# Patient Record
Sex: FEMALE | Race: WHITE | URBAN - METROPOLITAN AREA
[De-identification: names, ages, dates, MRNs, and addresses within clinical notes are randomized per-mention and may not be internally consistent; named-entity substitution may affect disease eponyms.]

---

## 2021-05-26 ENCOUNTER — OFFICE VISIT (OUTPATIENT)
Dept: FAMILY MEDICINE CLINIC | Age: 21
End: 2021-05-26
Payer: COMMERCIAL

## 2021-05-26 VITALS
WEIGHT: 132.2 LBS | DIASTOLIC BLOOD PRESSURE: 60 MMHG | HEART RATE: 80 BPM | OXYGEN SATURATION: 98 % | SYSTOLIC BLOOD PRESSURE: 110 MMHG

## 2021-05-26 DIAGNOSIS — L98.9 SKIN LESION: ICD-10-CM

## 2021-05-26 DIAGNOSIS — Z00.00 HEALTHCARE MAINTENANCE: ICD-10-CM

## 2021-05-26 DIAGNOSIS — Z76.89 ENCOUNTER TO ESTABLISH CARE: Primary | ICD-10-CM

## 2021-05-26 DIAGNOSIS — J30.2 SEASONAL ALLERGIES: ICD-10-CM

## 2021-05-26 PROCEDURE — 99203 OFFICE O/P NEW LOW 30 MIN: CPT | Performed by: FAMILY MEDICINE

## 2021-05-26 RX ORDER — CETIRIZINE HYDROCHLORIDE 10 MG/1
10 TABLET ORAL DAILY
Qty: 90 TABLET | Refills: 1 | Status: SHIPPED | OUTPATIENT
Start: 2021-05-26 | End: 2021-11-22

## 2021-05-26 ASSESSMENT — PATIENT HEALTH QUESTIONNAIRE - PHQ9
SUM OF ALL RESPONSES TO PHQ QUESTIONS 1-9: 0
1. LITTLE INTEREST OR PLEASURE IN DOING THINGS: 0
SUM OF ALL RESPONSES TO PHQ QUESTIONS 1-9: 0
SUM OF ALL RESPONSES TO PHQ9 QUESTIONS 1 & 2: 0
SUM OF ALL RESPONSES TO PHQ QUESTIONS 1-9: 0
2. FEELING DOWN, DEPRESSED OR HOPELESS: 0

## 2021-05-26 NOTE — PATIENT INSTRUCTIONS
Patient Education        Learning About Birth Control: Combination Pills  What are combination pills? Combination pills are used to prevent pregnancy. Most people call them \"the pill. \"  Combination pills release a regular dose of two hormones, estrogen and progestin. They prevent pregnancy in a few ways. They thicken the mucus in the cervix. This makes it hard for sperm to travel into the uterus. And they thin the lining of the uterus. This makes it harder for a fertilized egg to attach to the uterus. The hormones also can stop the ovaries from releasing an egg each month (ovulation). You have to take a pill every day to prevent pregnancy. The packages for these pills are different. The most common one has 3 weeks of hormone pills and 1 week of sugar pills. The sugar pills don't contain any hormones. You have your period on that week. But other packs have no sugar pills. If you take hormone pills for the whole month, you will not get your period as often. Or you may not get it at all. How well do they work? In the first year of use:  · When combination pills are taken exactly as directed, fewer than 1 woman out of 100 has an unplanned pregnancy. · When pills are not taken exactly as directed, such as forgetting to take them sometimes, 9 women out of 100 have an unplanned pregnancy. Be sure to tell your doctor about any health problems you have or medicines you take. He or she can help you choose the birth control method that is right for you. What are the advantages of combination pills? · These pills work better than barrier methods. Barrier methods include condoms and diaphragms. · They may reduce acne and heavy bleeding. They may also reduce cramping and other symptoms of PMS (premenstrual syndrome). · The pills let you control your periods. You can have periods every month or every few months. Or you can choose not to have them at all. · You don't have to interrupt sex to use the pills.   What are the disadvantages of combination pills? · You have to take a pill at the same time every day to prevent pregnancy. · Combination pills don't protect against sexually transmitted infections (STIs), such as herpes or HIV/AIDS. If you aren't sure if your sex partner might have an STI, use a condom to protect against disease. · They may cause changes in your period. You may have little bleeding, skipped periods, or spotting. · They may cause mood changes, less interest in sex, or weight gain. · Combination pills contain estrogen. They may not be right for you if you have certain health problems. Where can you learn more? Go to https://[email or web address]. org and sign in to your [employer] account. Enter Q231 in the [employer] box to learn more about \"Learning About Birth Control: Combination Pills. \"     If you do not have an account, please click on the \"Sign Up Now\" link. Current as of: October 8, 2020               Content Version: 12.8  © 2006-2021 Healthwise[employer] Incorporated. Care instructions adapted under license by [facility] ([facility]). If you have questions about a medical condition or this instruction, always ask your healthcare professional. [place] [address] any warranty or liability for your use of this information.

## 2021-05-26 NOTE — PROGRESS NOTES
5/26/2021    This is a 22 y.o. female who presents for  Chief Complaint   Patient presents with    New Patient       HPI:     No PMHx  Requests a referral to dermatology and gynecology   Seasonal allergies: takes zyrtec PRN  UTD with childhood vaccines     No acute concerning Sxs: No CP, SOB, palpitations, dizziness, HA, etc.      Past Medical History:   Diagnosis Date    Seasonal allergies        History reviewed. No pertinent surgical history. Social History     Socioeconomic History    Marital status: Single     Spouse name: Not on file    Number of children: Not on file    Years of education: Not on file    Highest education level: Not on file   Occupational History    Not on file   Tobacco Use    Smoking status: Never Smoker    Smokeless tobacco: Never Used   Vaping Use    Vaping Use: Never used   Substance and Sexual Activity    Alcohol use: Never    Drug use: Never    Sexual activity: Never   Other Topics Concern    Not on file   Social History Narrative    Not on file     Social Determinants of Health     Financial Resource Strain:     Difficulty of Paying Living Expenses:    Food Insecurity:     Worried About Running Out of Food in the Last Year:     [address] in the Last Year:    Transportation Needs:     Lack of Transportation (Medical):      Lack of Transportation (Non-Medical):    Physical Activity:     Days of Exercise per Week:     Minutes of Exercise per Session:    Stress:     Feeling of Stress :    Social Connections:     Frequency of Communication with Friends and Family:     Frequency of Social Gatherings with Friends and Family:     Attends Congregational Services:     Active Member of Clubs or Organizations:     Attends Club or Organization Meetings:     Marital Status:    Intimate Partner Violence:     Fear of Current or Ex-Partner:     Emotionally Abused:     Physically Abused:     Sexually Abused:        Family History   Problem Relation Age of Onset    Hypertension Father     Other Maternal Grandmother         Pulmonary HTN     Cancer Maternal Grandfather     Hypertension Paternal Grandmother     Diabetes Paternal Grandfather     Hypertension Paternal Grandfather     Uterine Cancer Maternal Great Grandmother     Breast Cancer Maternal Aunt        Current Outpatient Medications   Medication Sig Dispense Refill    cetirizine (ZYRTEC) 10 MG tablet Take 1 tablet by mouth daily 90 tablet 1    Cetirizine HCl (ZYRTEC ALLERGY PO) Take  by mouth. No current facility-administered medications for this visit. Immunization History   Administered Date(s) Administered    Influenza Virus Vaccine 10/27/2014       No Known Allergies    Review of Systems   Constitutional: Negative for activity change, appetite change, chills, diaphoresis, fatigue and fever. Eyes: Negative for visual disturbance. Respiratory: Negative for chest tightness and shortness of breath. Cardiovascular: Negative for chest pain, palpitations and leg swelling. Gastrointestinal: Negative for abdominal pain, nausea and vomiting. Genitourinary: Negative for decreased urine volume. Skin: Negative for color change. Neurological: Negative for dizziness, weakness, light-headedness, numbness and headaches. /60 (Site: Left Upper Arm, Position: Sitting, Cuff Size: Medium Adult)   Pulse 80   Wt 132 lb 3.2 oz (60 kg)   LMP 04/28/2021   SpO2 98%     Physical Exam  Vitals and nursing note reviewed. Constitutional:       General: She is not in acute distress. Appearance: She is well-developed. She is not diaphoretic. HENT:      Head: Normocephalic and atraumatic. Eyes:      Conjunctiva/sclera: Conjunctivae normal.      Pupils: Pupils are equal, round, and reactive to light. Neck:      Vascular: No JVD. Cardiovascular:      Rate and Rhythm: Normal rate and regular rhythm. Heart sounds: Normal heart sounds. No murmur heard. No friction rub. No gallop.

## 2021-06-18 PROBLEM — J30.2 SEASONAL ALLERGIES: Status: ACTIVE | Noted: 2021-06-18

## 2021-06-18 ASSESSMENT — ENCOUNTER SYMPTOMS
NAUSEA: 0
COLOR CHANGE: 0
ABDOMINAL PAIN: 0
SHORTNESS OF BREATH: 0
CHEST TIGHTNESS: 0
VOMITING: 0

## 2022-04-22 ENCOUNTER — HOSPITAL ENCOUNTER (OUTPATIENT)
Dept: ULTRASOUND IMAGING | Age: 22
Discharge: HOME | End: 2022-04-22
Payer: COMMERCIAL

## 2022-04-22 ENCOUNTER — OFFICE VISIT (OUTPATIENT)
Dept: FAMILY MEDICINE CLINIC | Age: 22
End: 2022-04-22
Payer: COMMERCIAL

## 2022-04-22 VITALS
DIASTOLIC BLOOD PRESSURE: 62 MMHG | TEMPERATURE: 98.2 F | SYSTOLIC BLOOD PRESSURE: 108 MMHG | BODY MASS INDEX: 22.5 KG/M2 | WEIGHT: 127 LBS | OXYGEN SATURATION: 98 % | HEART RATE: 72 BPM | HEIGHT: 63 IN

## 2022-04-22 DIAGNOSIS — E07.9 ASYMMETRICAL THYROID: ICD-10-CM

## 2022-04-22 DIAGNOSIS — Z00.00 ENCOUNTER FOR WELL ADULT EXAM WITHOUT ABNORMAL FINDINGS: Primary | ICD-10-CM

## 2022-04-22 PROCEDURE — 76536 US EXAM OF HEAD AND NECK: CPT

## 2022-04-22 PROCEDURE — 99395 PREV VISIT EST AGE 18-39: CPT | Performed by: FAMILY MEDICINE

## 2022-04-22 SDOH — ECONOMIC STABILITY: FOOD INSECURITY: WITHIN THE PAST 12 MONTHS, YOU WORRIED THAT YOUR FOOD WOULD RUN OUT BEFORE YOU GOT MONEY TO BUY MORE.: NEVER TRUE

## 2022-04-22 SDOH — ECONOMIC STABILITY: FOOD INSECURITY: WITHIN THE PAST 12 MONTHS, THE FOOD YOU BOUGHT JUST DIDN'T LAST AND YOU DIDN'T HAVE MONEY TO GET MORE.: NEVER TRUE

## 2022-04-22 ASSESSMENT — PATIENT HEALTH QUESTIONNAIRE - PHQ9
SUM OF ALL RESPONSES TO PHQ QUESTIONS 1-9: 0
2. FEELING DOWN, DEPRESSED OR HOPELESS: 0
SUM OF ALL RESPONSES TO PHQ9 QUESTIONS 1 & 2: 0
1. LITTLE INTEREST OR PLEASURE IN DOING THINGS: 0

## 2022-04-22 ASSESSMENT — ENCOUNTER SYMPTOMS
CONSTIPATION: 0
ABDOMINAL PAIN: 0
DIARRHEA: 0
VOMITING: 0
TROUBLE SWALLOWING: 0
SHORTNESS OF BREATH: 0
COLOR CHANGE: 0
BACK PAIN: 0
NAUSEA: 0
COUGH: 0
BLOOD IN STOOL: 0

## 2022-04-22 ASSESSMENT — SOCIAL DETERMINANTS OF HEALTH (SDOH): HOW HARD IS IT FOR YOU TO PAY FOR THE VERY BASICS LIKE FOOD, HOUSING, MEDICAL CARE, AND HEATING?: NOT HARD AT ALL

## 2022-04-22 NOTE — PROGRESS NOTES
Well Adult Note  Name: Tara Jimenez Date: 2022   MRN: [id number] Sex: Female   Age: 25 y.o. Ethnicity: Non- / Non    : 2000 Race: White (non-)      [place] Ty[place] is here for well adult exam.  History:    Exercise: no regular exercise  Diet: \"it's not bad, not picky\"    Patient's last menstrual period was 2022 (approximate). menstrual cycle: normal, monthly   Heavy for 2-3 days  Sexual activity: none   AbnormalSxs: no  Last PAP: n/a, discussed   Follows with gynecology? no    Last Mammogram: no  Family Hx of ovarian, breast, or uterine cancer: yes - updated  Self-breast exams: yes - intermittently     Previous DEXA scan: n/a    Previous Colonoscopy no  BRBR, unexplained WL, bloating, abd pain No  Family Hx of Colon Ca  no    Review of Systems   Constitutional: Negative for activity change, appetite change, chills, diaphoresis, fatigue, fever and unexpected weight change. HENT: Negative for ear pain, hearing loss and trouble swallowing. Eyes: Negative for visual disturbance. Respiratory: Negative for cough and shortness of breath. Cardiovascular: Negative for chest pain, palpitations and leg swelling. Gastrointestinal: Negative for abdominal pain, blood in stool, constipation, diarrhea, nausea and vomiting. Genitourinary: Negative for decreased urine volume, difficulty urinating, dysuria, flank pain, hematuria and urgency. Musculoskeletal: Negative for arthralgias and back pain. Skin: Negative for color change and rash. Neurological: Negative for dizziness, weakness, light-headedness, numbness and headaches. Psychiatric/Behavioral: Negative for dysphoric mood and sleep disturbance. The patient is not nervous/anxious. No Known Allergies      Prior to Visit Medications    Medication Sig Taking? Authorizing Provider   Cetirizine HCl (ZYRTEC ALLERGY PO) Take  by mouth.  Yes Historical Provider, MD         Past Medical History:   Diagnosis Date  Seasonal allergies        No past surgical history on file. Family History   Problem Relation Age of Onset    Hypertension Father     Other Maternal Grandmother         Pulmonary HTN     Cancer Maternal Grandfather     Hypertension Paternal Grandmother     Diabetes Paternal Grandfather     Hypertension Paternal Grandfather     Uterine Cancer Maternal Great Grandmother     Breast Cancer Maternal Aunt        Social History     Tobacco Use    Smoking status: Never Smoker    Smokeless tobacco: Never Used   Vaping Use    Vaping Use: Never used   Substance Use Topics    Alcohol use: Never    Drug use: Never       Objective   /62 (Site: Right Upper Arm, Position: Sitting, Cuff Size: Small Adult)   Pulse 72   Temp 98.2 °F (36.8 °C)   Ht 5' 3.39\" (1.61 m) Comment: without shoes  Wt 127 lb (57.6 kg) Comment: without shoe  LMP 03/30/2022 (Approximate)   SpO2 98%   BMI 22.22 kg/m²   Wt Readings from Last 3 Encounters:   04/22/22 127 lb (57.6 kg)   05/26/21 132 lb 3.2 oz (60 kg)   10/27/14 106 lb 9.6 oz (48.4 kg) (51 %, Z= 0.01)*     * Growth percentiles are based on CDC (Girls, 2-20 Years) data. There were no vitals filed for this visit. Physical Exam  Vitals and nursing note reviewed. Constitutional:       General: She is not in acute distress. Appearance: She is well-developed. She is not diaphoretic. HENT:      Head: Normocephalic and atraumatic. Right Ear: External ear normal.      Left Ear: External ear normal.      Mouth/Throat:      Pharynx: No oropharyngeal exudate. Eyes:      General:         Right eye: No discharge. Left eye: No discharge. Conjunctiva/sclera: Conjunctivae normal.      Pupils: Pupils are equal, round, and reactive to light. Neck:      Thyroid: No thyromegaly. Comments: R>L thyroid gland, mild  Cardiovascular:      Rate and Rhythm: Normal rate and regular rhythm. Heart sounds: Normal heart sounds. No murmur heard.   No friction rub. No gallop. Pulmonary:      Effort: Pulmonary effort is normal. No respiratory distress. Breath sounds: Normal breath sounds. No wheezing or rales. Abdominal:      General: Bowel sounds are normal. There is no distension. Palpations: Abdomen is soft. Tenderness: There is no abdominal tenderness. There is no guarding or rebound. Musculoskeletal:         General: Normal range of motion. Cervical back: Normal range of motion and neck supple. Lymphadenopathy:      Cervical: No cervical adenopathy. Skin:     General: Skin is warm and dry. Coloration: Skin is not pale. Findings: No erythema or rash. Neurological:      Mental Status: She is alert. Cranial Nerves: No cranial nerve deficit. Coordination: Coordination normal.   Psychiatric:         Behavior: Behavior normal.         Thought Content: Thought content normal.         Judgment: Judgment normal.           Assessment   Plan   1. Encounter for well adult exam without abnormal findings  2.  Asymmetrical thyroid  -     US THYROID; Future         Personalized Preventive Plan   Current Health Maintenance Status  Immunization History   Administered Date(s) Administered    DTP 2001, 2001, 2001, 09/20/2002, 08/06/2006    HPV 9-valent Mace ) 05/24/2018, 06/24/2018, 12/30/2018    Hepatitis B Ped/Adol (Engerix-B, Recombivax HB) 2001, 2001, 09/20/2002    Hib vaccine 2001, 2001, 2001    Influenza Virus Vaccine 10/27/2014    Influenza, Denice Leilani, IM, PF (6 mo and older Fluzone, Flulaval, Fluarix, and 3 yrs and older Afluria) 12/30/2018    MMR 06/17/2002, 03/08/2005    Meningococcal B, Recombinant Milon Roam) 05/24/2018, 06/24/2018    Meningococcal MCV4P (Menactra) 08/13/2018    Pneumococcal Conjugate 7-valent (Prevnar7) 03/10/2003    Polio IPV (IPOL) 08/23/2013    Polio Virus Vaccine 2001, 2001, 09/20/2002    Tdap (Boostrix, Adacel) 08/23/2013  Varicella (Varivax) 06/17/2002        Health Maintenance   Topic Date Due    Varicella vaccine (2 of 2 - 2-dose childhood series) 03/02/2005    HIV screen  Never done    Chlamydia screen  Never done    Hepatitis C screen  Never done    Pap smear  Never done    COVID-19 Vaccine (1) 04/22/2023 (Originally 3/2/2006)    Depression Screen  05/26/2022    Flu vaccine (Season Ended) 09/01/2022    DTaP/Tdap/Td vaccine (6 - Td or Tdap) 08/23/2023    Hepatitis B vaccine  Completed    HPV vaccine  Completed    Meningococcal (ACWY) vaccine  Completed    Hepatitis A vaccine  Aged Out    Hib vaccine  Aged Out    Pneumococcal 0-64 years Vaccine  Aged Out     Recommendations for Styky Due: see orders and patient instructions/AVS.    Return in about 1 year (around 4/22/2023) for annual physical.

## 2022-04-22 NOTE — PATIENT INSTRUCTIONS

## 2022-04-22 NOTE — Clinical Note
I forgot to give the paper for the thyroid US during her physical. Please call pt with instructions on how to schedule her thyroid ultrasound.  Thank you

## 2022-04-25 ENCOUNTER — TELEPHONE (OUTPATIENT)
Dept: FAMILY MEDICINE CLINIC | Age: 22
End: 2022-04-25

## 2022-04-25 NOTE — TELEPHONE ENCOUNTER
Left message for patient to call back. Please give her Central scheduling phone number [phone]. This is to schedule her Thyroid Ultrasound.

## 2022-04-25 NOTE — TELEPHONE ENCOUNTER
----- Message from Joni Huitron MD sent at 4/22/2022  9:32 AM EDT -----  I forgot to give the paper for the thyroid US during her physical. Please call pt with instructions on how to schedule her thyroid ultrasound.  Thank you

## 2022-06-01 DIAGNOSIS — E07.9 ASYMMETRICAL THYROID: Primary | ICD-10-CM

## 2022-06-02 DIAGNOSIS — E07.9 ASYMMETRICAL THYROID: ICD-10-CM

## 2022-06-02 LAB
T4 FREE: 1.3 NG/DL (ref 0.9–1.8)
TSH SERPL DL<=0.05 MIU/L-ACNC: 1.57 UIU/ML (ref 0.27–4.2)

## 2023-05-18 ENCOUNTER — PROCEDURE VISIT (OUTPATIENT)
Dept: FAMILY MEDICINE CLINIC | Age: 23
End: 2023-05-18

## 2023-05-18 VITALS
DIASTOLIC BLOOD PRESSURE: 64 MMHG | HEIGHT: 63 IN | BODY MASS INDEX: 22.47 KG/M2 | SYSTOLIC BLOOD PRESSURE: 110 MMHG | OXYGEN SATURATION: 98 % | TEMPERATURE: 98.3 F | WEIGHT: 126.8 LBS | HEART RATE: 72 BPM

## 2023-05-18 DIAGNOSIS — L98.9 SKIN LESION: Primary | ICD-10-CM

## 2023-05-18 DIAGNOSIS — D48.5 NEOPLASM OF UNCERTAIN BEHAVIOR OF SKIN: ICD-10-CM

## 2023-05-18 DIAGNOSIS — L81.9 HYPERPIGMENTED SKIN LESION: ICD-10-CM

## 2023-05-18 SDOH — ECONOMIC STABILITY: FOOD INSECURITY: WITHIN THE PAST 12 MONTHS, THE FOOD YOU BOUGHT JUST DIDN'T LAST AND YOU DIDN'T HAVE MONEY TO GET MORE.: NEVER TRUE

## 2023-05-18 SDOH — ECONOMIC STABILITY: INCOME INSECURITY: HOW HARD IS IT FOR YOU TO PAY FOR THE VERY BASICS LIKE FOOD, HOUSING, MEDICAL CARE, AND HEATING?: NOT HARD AT ALL

## 2023-05-18 SDOH — ECONOMIC STABILITY: HOUSING INSECURITY
IN THE LAST 12 MONTHS, WAS THERE A TIME WHEN YOU DID NOT HAVE A STEADY PLACE TO SLEEP OR SLEPT IN A SHELTER (INCLUDING NOW)?: NO

## 2023-05-18 SDOH — ECONOMIC STABILITY: FOOD INSECURITY: WITHIN THE PAST 12 MONTHS, YOU WORRIED THAT YOUR FOOD WOULD RUN OUT BEFORE YOU GOT MONEY TO BUY MORE.: NEVER TRUE

## 2023-05-18 ASSESSMENT — PATIENT HEALTH QUESTIONNAIRE - PHQ9
3. TROUBLE FALLING OR STAYING ASLEEP: 0
5. POOR APPETITE OR OVEREATING: 0
SUM OF ALL RESPONSES TO PHQ QUESTIONS 1-9: 0
4. FEELING TIRED OR HAVING LITTLE ENERGY: 0
10. IF YOU CHECKED OFF ANY PROBLEMS, HOW DIFFICULT HAVE THESE PROBLEMS MADE IT FOR YOU TO DO YOUR WORK, TAKE CARE OF THINGS AT HOME, OR GET ALONG WITH OTHER PEOPLE: 0
2. FEELING DOWN, DEPRESSED OR HOPELESS: 0
6. FEELING BAD ABOUT YOURSELF - OR THAT YOU ARE A FAILURE OR HAVE LET YOURSELF OR YOUR FAMILY DOWN: 0
SUM OF ALL RESPONSES TO PHQ9 QUESTIONS 1 & 2: 0
SUM OF ALL RESPONSES TO PHQ QUESTIONS 1-9: 0
8. MOVING OR SPEAKING SO SLOWLY THAT OTHER PEOPLE COULD HAVE NOTICED. OR THE OPPOSITE, BEING SO FIGETY OR RESTLESS THAT YOU HAVE BEEN MOVING AROUND A LOT MORE THAN USUAL: 0
SUM OF ALL RESPONSES TO PHQ QUESTIONS 1-9: 0
SUM OF ALL RESPONSES TO PHQ QUESTIONS 1-9: 0
1. LITTLE INTEREST OR PLEASURE IN DOING THINGS: 0
7. TROUBLE CONCENTRATING ON THINGS, SUCH AS READING THE NEWSPAPER OR WATCHING TELEVISION: 0
9. THOUGHTS THAT YOU WOULD BE BETTER OFF DEAD, OR OF HURTING YOURSELF: 0

## 2023-05-18 ASSESSMENT — ANXIETY QUESTIONNAIRES
3. WORRYING TOO MUCH ABOUT DIFFERENT THINGS: 0
5. BEING SO RESTLESS THAT IT IS HARD TO SIT STILL: 0
IF YOU CHECKED OFF ANY PROBLEMS ON THIS QUESTIONNAIRE, HOW DIFFICULT HAVE THESE PROBLEMS MADE IT FOR YOU TO DO YOUR WORK, TAKE CARE OF THINGS AT HOME, OR GET ALONG WITH OTHER PEOPLE: NOT DIFFICULT AT ALL
2. NOT BEING ABLE TO STOP OR CONTROL WORRYING: 0
4. TROUBLE RELAXING: 0
1. FEELING NERVOUS, ANXIOUS, OR ON EDGE: 0
6. BECOMING EASILY ANNOYED OR IRRITABLE: 0
GAD7 TOTAL SCORE: 0
7. FEELING AFRAID AS IF SOMETHING AWFUL MIGHT HAPPEN: 0

## 2023-05-18 ASSESSMENT — ENCOUNTER SYMPTOMS
SHORTNESS OF BREATH: 0
NAUSEA: 0
COLOR CHANGE: 1

## 2023-05-18 NOTE — PATIENT INSTRUCTIONS
Basic Wound Care Instruction from Dr. Mare Marx    Keep the area dry for 24-48 hours after your procedure. Do not submerge the wound in water, especially pools, hot tubs, rivers, lakes, or oceans. You can shower, however, keep the areas covered and away from direct water. Change the bandage after immediately if it's wet. Cleanse the wound twice daily with warm water and unfragranced soap (I.e. Orange Dial soap). Always wash your hands before you clean your wound. Pat the wound completely dry afterwards. Apply topical triple antibiotic ointment (or any other ointment I discussed with you during my visit) and a clean dressing with every wound cleaning (either gauze if the wound is draining a lot or a large bandaid). You can leave the wound open to the air once the area is covered with a protective scab. You can apply Vaseline nightly to assist with the healing process. If you are returning for suture removal, aim to see me in 8-9 days from your procedure. For pain, You can take over-the-counter Advil OR Motrin OR Ibuprofen 400-600 mg every 8 hours as needed, unless we talked about something specific during your visit, OR unless you have been told in the past not to take these medications. If this is the case, take 500 mg of Tylenol.

## 2023-05-18 NOTE — PROGRESS NOTES
Hypertension Paternal Grandfather     Uterine Cancer Maternal Great Grandmother     Breast Cancer Maternal Aunt        Current Outpatient Medications   Medication Sig Dispense Refill    Cetirizine HCl (ZYRTEC ALLERGY PO) Take  by mouth. No current facility-administered medications for this visit. Immunization History   Administered Date(s) Administered    DTP 2001, 2001, 2001, 09/20/2002, 08/06/2006    HPV, GARDASIL 9, (age 6y-42y), IM, 0.5mL 05/24/2018, 06/24/2018, 12/30/2018    Hep B, ENGERIX-B, RECOMBIVAX-HB, (age Birth - 22y), IM, 0.5mL 2001, 2001, 09/20/2002    Hib vaccine 2001, 2001, 2001    Influenza Virus Vaccine 10/27/2014    Influenza, FLUARIX, FLULAVAL, Abdelrahman Lang (age 10 mo+) AND AFLURIA, (age 1 y+), PF, 0.5mL 12/30/2018    MMR, Lucilla Endo, M-M-R II, (age 12m+), SC, 0.5mL 06/17/2002, 03/08/2005    Meningococcal ACWY, MENACTRA (MenACWY-D), (age 7m-55y), IM, 0.5mL 08/13/2018    Meningococcal Francee Bur, (age 6y-22y), IM, 0.5mL 05/24/2018, 06/24/2018    Pneumococcal Conjugate 7-valent (Prevnar7) 03/10/2003    Polio Virus Vaccine 2001, 2001, 09/20/2002    Poliovirus, IPOL, (age 6w+), SC/IM, 0.5mL 08/23/2013    TDaP, ADACEL (age 10y-63y), BOOSTRIX (age 10y+), IM, 0.5mL 08/23/2013    Varicella, VARIVAX, (age 12m+), SC, 0.5mL 06/17/2002       No Known Allergies    Review of Systems   Constitutional:  Negative for activity change, fever and unexpected weight change. Respiratory:  Negative for shortness of breath. Cardiovascular:  Negative for chest pain. Gastrointestinal:  Negative for nausea. Skin:  Positive for color change. Negative for pallor, rash and wound. Allergic/Immunologic: Negative for immunocompromised state. Hematological:  Negative for adenopathy.      /64   Pulse 72   Temp 98.3 °F (36.8 °C)   Ht 5' 3\" (1.6 m)   Wt 126 lb 12.8 oz (57.5 kg)   SpO2 98%   BMI 22.46 kg/m²     Physical Exam  Vitals and nursing